# Patient Record
Sex: MALE | Race: WHITE | NOT HISPANIC OR LATINO | Employment: PART TIME | URBAN - NONMETROPOLITAN AREA
[De-identification: names, ages, dates, MRNs, and addresses within clinical notes are randomized per-mention and may not be internally consistent; named-entity substitution may affect disease eponyms.]

---
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
## 2023-05-12 ENCOUNTER — HOSPITAL ENCOUNTER (EMERGENCY)
Facility: HOSPITAL | Age: 23
Discharge: HOME | End: 2023-05-12
Attending: FAMILY MEDICINE
Payer: MEDICAID

## 2023-05-12 VITALS
DIASTOLIC BLOOD PRESSURE: 62 MMHG | TEMPERATURE: 99 F | HEART RATE: 62 BPM | WEIGHT: 126 LBS | SYSTOLIC BLOOD PRESSURE: 120 MMHG | RESPIRATION RATE: 18 BRPM | OXYGEN SATURATION: 97 % | BODY MASS INDEX: 20.25 KG/M2 | HEIGHT: 66 IN

## 2023-05-12 DIAGNOSIS — S00.33XA CONTUSION OF NOSE: ICD-10-CM

## 2023-05-12 PROCEDURE — 99283 EMERGENCY DEPT VISIT LOW MDM: CPT

## 2023-05-12 NOTE — Clinical Note
"Rudolph "Rudolph" Lion was seen and treated in our emergency department on 5/12/2023.  He may return to work on 05/15/2023.       If you have any questions or concerns, please don't hesitate to call.      JENNIFER Duran"

## 2023-05-12 NOTE — ED PROVIDER NOTES
Encounter Date: 5/12/2023       History     Chief Complaint   Patient presents with    Headache     C/o nose injury that happened approx 1 week ago and headaches since the injury.     Hit in nose by metal tool x 1 week ago  Now having headaches    The history is provided by the patient. No  was used.   Review of patient's allergies indicates:   Allergen Reactions    Sulfa (sulfonamide antibiotics)      History reviewed. No pertinent past medical history.  History reviewed. No pertinent surgical history.  History reviewed. No pertinent family history.  Social History     Tobacco Use    Smoking status: Every Day     Types: Cigarettes    Smokeless tobacco: Never   Substance Use Topics    Alcohol use: Yes    Drug use: Never     Review of Systems   HENT:  Negative for nosebleeds.    Neurological:  Positive for headaches.   All other systems reviewed and are negative.    Physical Exam     Initial Vitals [05/12/23 1440]   BP Pulse Resp Temp SpO2   120/62 62 18 98.8 °F (37.1 °C) 97 %      MAP       --         Physical Exam    Nursing note and vitals reviewed.  HENT:   Head: Normocephalic.   Mouth/Throat: Oropharynx is clear and moist.   Nasal bridge swelling, small abrasion right lateral nasal bridge  + ecchymosis inner canthi   Eyes: Conjunctivae and EOM are normal. Pupils are equal, round, and reactive to light. Right eye exhibits no discharge. Left eye exhibits no discharge.   Neck: Neck supple.   Normal range of motion.  Cardiovascular:  Normal rate and regular rhythm.           No murmur heard.  Pulmonary/Chest: Breath sounds normal. No respiratory distress.   Abdominal: Abdomen is soft.   Musculoskeletal:         General: Normal range of motion.      Cervical back: Normal range of motion and neck supple.     Neurological: He is alert and oriented to person, place, and time. GCS score is 15. GCS eye subscore is 4. GCS verbal subscore is 5. GCS motor subscore is 6.   Skin: Skin is warm and dry.  Capillary refill takes less than 2 seconds. No rash noted.   Psychiatric: He has a normal mood and affect. Thought content normal.       ED Course   Procedures  Labs Reviewed - No data to display       Imaging Results              X-Ray Nasal Bones (Final result)  Result time 05/12/23 15:09:41      Final result by Shalonda Blankenship III, MD (05/12/23 15:09:41)                   Impression:      1. No significant abnormalities are noted.      Electronically signed by: Shalonda Blankenship  Date:    05/12/2023  Time:    15:09               Narrative:    EXAMINATION:  STUDY: XR NASAL BONES    CLINICAL HISTORY AND TECHNIQUE:  Isabel Ward RT on 5/12/2023  2:53 PM    CURRENT CLINICAL HISTORY: ER PT.    C/O NOSE INJURY THAT HAPPENED APPROX 1WK AGO AND HEADACHES SINCE THE INJURY    PMH: N/A    TECHNIQUE: NASAL BONES    TECHNOLOGIST:QUYEN    COMPARISON:  None    FINDINGS:  I see no definite fractures involving the nasal bones are bony nasal septum.  The bony nasal septum is midline.  The orbital rims and adjacent facial bones appear intact.  The paranasal sinuses appear normally aerated.                                    X-Rays:   Independently Interpreted Readings:   Other Readings:  No fractures    Medications - No data to display  Medical Decision Making:   Initial Assessment:   Nasal contusion  Differential Diagnosis:    Nasal fracture  Independently Interpreted Test(s):   I have ordered and independently interpreted X-rays - see summary below.       <> Summary of X-Ray Reading(s): No acute fracture seen  Clinical Tests:   Radiological Study: Ordered and Reviewed  ED Management:  Discussed results with patient.  Follow up as needed prn with primary care provider    Medications:    Motrin as needed for pain                            Clinical Impression:   Final diagnoses:  [S00.33XA] Contusion of nose        ED Disposition Condition    Discharge Stable          ED Prescriptions    None       Follow-up Information    Flaca Araya  FIRO Kemp, [facility]  05/12/23 1519       Quiana Kemp, [facility]  05/12/23 1520       Quiana Kemp, [facility]  05/12/23 152

## 2024-02-22 ENCOUNTER — HOSPITAL ENCOUNTER (EMERGENCY)
Facility: HOSPITAL | Age: 24
Discharge: HOME | End: 2024-02-22

## 2024-02-22 VITALS
HEART RATE: 71 BPM | BODY MASS INDEX: 19.25 KG/M2 | RESPIRATION RATE: 20 BRPM | HEIGHT: 68 IN | DIASTOLIC BLOOD PRESSURE: 69 MMHG | SYSTOLIC BLOOD PRESSURE: 118 MMHG | WEIGHT: 127 LBS | OXYGEN SATURATION: 97 % | TEMPERATURE: 98 F

## 2024-02-22 DIAGNOSIS — S62.339A CLOSED BOXER'S FRACTURE, INITIAL ENCOUNTER: Primary | ICD-10-CM

## 2024-02-22 PROCEDURE — 29125 APPL SHORT ARM SPLINT STATIC: CPT | Mod: RT

## 2024-02-22 PROCEDURE — 99283 EMERGENCY DEPT VISIT LOW MDM: CPT | Mod: 25

## 2024-02-22 RX ORDER — MELOXICAM 7.5 MG/1
7.5 TABLET ORAL DAILY
Qty: 20 TABLET | Refills: 0 | Status: SHIPPED | OUTPATIENT
Start: 2024-02-22

## 2024-02-22 NOTE — Clinical Note
"Rudolph "Rudolph" Lion was seen and treated in our emergency department on 2/22/2024.  He may return to work on 02/23/2024.       If you have any questions or concerns, please don't hesitate to call.      Rachael Stockton, MARYP"

## 2024-02-22 NOTE — ED PROVIDER NOTES
Encounter Date: 2/22/2024       History     Chief Complaint   Patient presents with    Hand Pain     Pt reports he fell and landed on right hand, swelling noted to right hand. Pt can move all digits, minimal movement noted to pinky finger.     RT LATERAL HAND SWELLING AND BRUISING S/P PUNCHING A SOLID OBJECT PTA      Review of patient's allergies indicates:   Allergen Reactions    Sulfa (sulfonamide antibiotics)      History reviewed. No pertinent past medical history.  History reviewed. No pertinent surgical history.  History reviewed. No pertinent family history.  Social History     Tobacco Use    Smoking status: Former     Types: Cigarettes    Smokeless tobacco: Current    Tobacco comments:     Patient uses vape.   Substance Use Topics    Alcohol use: Yes    Drug use: Never     Review of Systems   Musculoskeletal:         RT HAND PAIN , BRUISING AND SWELLING   All other systems reviewed and are negative.      Physical Exam     Initial Vitals [02/22/24 1644]   BP Pulse Resp Temp SpO2   118/69 71 20 97.8 °F (36.6 °C) 97 %      MAP       --         Physical Exam    Nursing note and vitals reviewed.  Constitutional: He appears well-developed and well-nourished.   HENT:   Head: Normocephalic and atraumatic.   Mouth/Throat: Mucous membranes are normal.   Eyes: Pupils are equal, round, and reactive to light.   Neck:   Normal range of motion.  Cardiovascular:  Normal rate.           Pulmonary/Chest: Breath sounds normal.   Musculoskeletal:         General: Normal range of motion.      Cervical back: Normal range of motion.      Comments: RT LATERAL HAND MILD SWELLING AND BRUISING     Neurological: He is alert and oriented to person, place, and time.   Skin: Skin is warm and dry. Capillary refill takes less than 2 seconds.   Psychiatric: He has a normal mood and affect. His behavior is normal. Judgment and thought content normal.         ED Course   Procedures  Labs Reviewed - No data to display       Imaging Results               X-Ray Hand 3 view Right (Final result)  Result time 02/22/24 17:10:17      Final result by Bakari Rodriguez MD (02/22/24 17:10:17)                   Impression:      Findings as described above.      Electronically signed by: Bakari Rodriguez  Date:    02/22/2024  Time:    17:10               Narrative:    EXAMINATION:  XR HAND COMPLETE 3 VIEW RIGHT    CLINICAL HISTORY:  trauma;    TECHNIQUE:  PA, lateral, and oblique views of the right hand were performed.    COMPARISON:  None    FINDINGS:  Soft tissue swelling is present with a nondisplaced boxer type fracture involving the 5th metacarpal.                                       Medications - No data to display  Medical Decision Making  SPLINT APPLIED BY ENEDINA DIXON RNI    Problems Addressed:  Closed boxer's fracture, initial encounter: acute illness or injury    Amount and/or Complexity of Data Reviewed  Radiology: ordered.    Risk  Prescription drug management.                                      Clinical Impression:  Final diagnoses:  [S62.339A] Closed boxer's fracture, initial encounter (Primary)          ED Disposition Condition    Discharge Stable          ED Prescriptions       Medication Sig Dispense Start Date End Date Auth. Provider    meloxicam (MOBIC) 7.5 MG tablet Take 1 tablet (7.5 mg total) by mouth once daily. 20 tablet 2/22/2024 -- Rachael Stockton FNP          Follow-up Information       Follow up With Specialties Details Why Contact Info    Emanuel To MD Orthopedic Surgery Call in 1 day  [address]  [facility] [id number] [phone]               Rachael Stockton FNP  02/22/24 4905